# Patient Record
Sex: FEMALE | Race: WHITE | NOT HISPANIC OR LATINO | Employment: OTHER | URBAN - METROPOLITAN AREA
[De-identification: names, ages, dates, MRNs, and addresses within clinical notes are randomized per-mention and may not be internally consistent; named-entity substitution may affect disease eponyms.]

---

## 2019-02-11 ENCOUNTER — HOSPITAL ENCOUNTER (OUTPATIENT)
Dept: NON INVASIVE DIAGNOSTICS | Facility: HOSPITAL | Age: 74
Discharge: HOME | End: 2019-02-11
Attending: OTOLARYNGOLOGY
Payer: COMMERCIAL

## 2019-02-11 ENCOUNTER — TRANSCRIBE ORDERS (OUTPATIENT)
Dept: ADMINISTRATIVE | Facility: HOSPITAL | Age: 74
End: 2019-02-11

## 2019-02-11 ENCOUNTER — APPOINTMENT (OUTPATIENT)
Dept: LAB | Facility: HOSPITAL | Age: 74
End: 2019-02-11
Attending: OTOLARYNGOLOGY
Payer: COMMERCIAL

## 2019-02-11 DIAGNOSIS — R68.2 DRY MOUTH: ICD-10-CM

## 2019-02-11 DIAGNOSIS — J32.3 CHRONIC SPHENOIDAL SINUSITIS: ICD-10-CM

## 2019-02-11 DIAGNOSIS — R68.2 DRY MOUTH: Primary | ICD-10-CM

## 2019-02-11 DIAGNOSIS — J32.2 CHRONIC ETHMOIDAL SINUSITIS: ICD-10-CM

## 2019-02-11 DIAGNOSIS — J32.9 CHRONIC SINUSITIS, UNSPECIFIED LOCATION: ICD-10-CM

## 2019-02-11 LAB
ANION GAP SERPL CALCULATED.3IONS-SCNC: 10 MMOL/L (ref 4–13)
ATRIAL RATE: 83 BPM
BASOPHILS # BLD AUTO: 0.04 THOUSANDS/ΜL (ref 0–0.1)
BASOPHILS NFR BLD AUTO: 0 % (ref 0–1)
BUN SERPL-MCNC: 13 MG/DL (ref 5–25)
CALCIUM SERPL-MCNC: 9.3 MG/DL (ref 8.3–10.1)
CHLORIDE SERPL-SCNC: 109 MMOL/L (ref 100–108)
CO2 SERPL-SCNC: 25 MMOL/L (ref 21–32)
CREAT SERPL-MCNC: 0.99 MG/DL (ref 0.6–1.3)
EOSINOPHIL # BLD AUTO: 0.45 THOUSAND/ΜL (ref 0–0.61)
EOSINOPHIL NFR BLD AUTO: 5 % (ref 0–6)
ERYTHROCYTE [DISTWIDTH] IN BLOOD BY AUTOMATED COUNT: 14.2 % (ref 11.6–15.1)
GFR SERPL CREATININE-BSD FRML MDRD: 56 ML/MIN/1.73SQ M
GLUCOSE SERPL-MCNC: 96 MG/DL (ref 65–140)
HCT VFR BLD AUTO: 40.3 % (ref 34.8–46.1)
HGB BLD-MCNC: 12.2 G/DL (ref 11.5–15.4)
IMM GRANULOCYTES # BLD AUTO: 0.03 THOUSAND/UL (ref 0–0.2)
IMM GRANULOCYTES NFR BLD AUTO: 0 % (ref 0–2)
LYMPHOCYTES # BLD AUTO: 3.59 THOUSANDS/ΜL (ref 0.6–4.47)
LYMPHOCYTES NFR BLD AUTO: 38 % (ref 14–44)
MCH RBC QN AUTO: 27.9 PG (ref 26.8–34.3)
MCHC RBC AUTO-ENTMCNC: 30.3 G/DL (ref 31.4–37.4)
MCV RBC AUTO: 92 FL (ref 82–98)
MONOCYTES # BLD AUTO: 0.75 THOUSAND/ΜL (ref 0.17–1.22)
MONOCYTES NFR BLD AUTO: 8 % (ref 4–12)
NEUTROPHILS # BLD AUTO: 4.51 THOUSANDS/ΜL (ref 1.85–7.62)
NEUTS SEG NFR BLD AUTO: 49 % (ref 43–75)
NRBC BLD AUTO-RTO: 0 /100 WBCS
P AXIS: 74 DEGREES
PLATELET # BLD AUTO: 214 THOUSANDS/UL (ref 149–390)
PMV BLD AUTO: 11.8 FL (ref 8.9–12.7)
POTASSIUM SERPL-SCNC: 3.9 MMOL/L (ref 3.5–5.3)
PR INTERVAL: 156 MS
QRS AXIS: 40 DEGREES
QRSD INTERVAL: 86 MS
QT INTERVAL: 386 MS
QTC INTERVAL: 453 MS
RBC # BLD AUTO: 4.38 MILLION/UL (ref 3.81–5.12)
SODIUM SERPL-SCNC: 144 MMOL/L (ref 136–145)
T WAVE AXIS: 60 DEGREES
VENTRICULAR RATE: 83 BPM
WBC # BLD AUTO: 9.37 THOUSAND/UL (ref 4.31–10.16)

## 2019-02-11 PROCEDURE — 85025 COMPLETE CBC W/AUTO DIFF WBC: CPT

## 2019-02-11 PROCEDURE — 93005 ELECTROCARDIOGRAM TRACING: CPT

## 2019-02-11 PROCEDURE — 93010 ELECTROCARDIOGRAM REPORT: CPT | Performed by: INTERNAL MEDICINE

## 2019-02-11 PROCEDURE — 36415 COLL VENOUS BLD VENIPUNCTURE: CPT

## 2019-02-11 PROCEDURE — 80048 BASIC METABOLIC PNL TOTAL CA: CPT

## 2019-02-22 PROCEDURE — 88305 TISSUE EXAM BY PATHOLOGIST: CPT | Performed by: PATHOLOGY

## 2019-02-25 ENCOUNTER — LAB REQUISITION (OUTPATIENT)
Dept: LAB | Facility: HOSPITAL | Age: 74
End: 2019-02-25
Payer: COMMERCIAL

## 2019-02-25 DIAGNOSIS — J32.2 CHRONIC ETHMOIDAL SINUSITIS: ICD-10-CM

## 2019-02-25 DIAGNOSIS — J32.1 CHRONIC FRONTAL SINUSITIS: ICD-10-CM

## 2020-08-31 ENCOUNTER — HOSPITAL ENCOUNTER (OUTPATIENT)
Dept: RADIOLOGY | Facility: HOSPITAL | Age: 75
Discharge: HOME | End: 2020-08-31
Attending: OTOLARYNGOLOGY
Payer: COMMERCIAL

## 2020-08-31 DIAGNOSIS — R13.14 PHARYNGOESOPHAGEAL DYSPHAGIA: ICD-10-CM

## 2020-08-31 PROCEDURE — 74220 X-RAY XM ESOPHAGUS 1CNTRST: CPT

## 2020-09-03 ENCOUNTER — TELEPHONE (OUTPATIENT)
Dept: GASTROENTEROLOGY | Facility: CLINIC | Age: 75
End: 2020-09-03

## 2020-09-03 ENCOUNTER — HOSPITAL ENCOUNTER (OUTPATIENT)
Dept: RADIOLOGY | Facility: HOSPITAL | Age: 75
Discharge: HOME | End: 2020-09-03
Attending: OTOLARYNGOLOGY
Payer: COMMERCIAL

## 2020-09-03 DIAGNOSIS — T17.908A ASPIRATION INTO AIRWAY, INITIAL ENCOUNTER: ICD-10-CM

## 2020-09-03 PROCEDURE — 92611 MOTION FLUOROSCOPY/SWALLOW: CPT

## 2020-09-03 PROCEDURE — 74230 X-RAY XM SWLNG FUNCJ C+: CPT

## 2020-09-03 NOTE — TELEPHONE ENCOUNTER
----- Message from Herb Luciano MD sent at 9/3/2020  3:42 PM EDT -----  Regarding: FW: referral  Can be make sure we get this patient are schedule sooner rather than later  Thanks Mercy Pratt!  ----- Message -----  From: Salma Dela Cruz MD  Sent: 9/1/2020  10:25 AM EDT  To: Herb Luciano MD, Aponte Never  Subject: referral                                         Ximena Valdez is sending us this patient for dysphagia/aspiration and esophageal dysmotility on barium swallow - like a "flaccid esophagus"  A lot of issues to sort out together but wanted to give you heads up  I am going to try to get her on my schedule ASAP to see what I can do to help with aspiration from laryngopharyngeal end  I think a VBS is in the works as well        Valdo Hunter

## 2020-09-03 NOTE — PROCEDURES
Video Swallow Study      Patient Name: Thong GANDHIY Date: 9/3/2020        Past Medical History  Past Medical History:   Diagnosis Date    Allergic     Anxiety     Brain aneurysm     Cataract     Cough     Depression     Emphysema of lung ([facility] [address] )     Hearing loss     Hypertension     Hypothyroid     Leukocytoclastic vasculitis (HCC)     BRENDEN (mycobacterium avium-intracellulare) ([facility] [address] )     MGUS (monoclonal gammopathy of unknown significance)     Migraine     Nasal congestion     Neuropathy     Peripheral neuralgia     Pneumonia     Prediabetes     Renal insufficiency     Shortness of breath     Sinusitis     TIA (transient ischemic attack)     Type 2 diabetes mellitus (HCC)     UTI (urinary tract infection)     Vasculitis (HCC)    Reflux, on PPI     Past Surgical History  Past Surgical History:   Procedure Laterality Date    BACK SURGERY      CATARACT EXTRACTION, BILATERAL      GALLBLADDER SURGERY      HYSTERECTOMY      LUNG SURGERY      NECK SURGERY      SINUS SURGERY         Video Barium Swallow Study    Summary:  Pt presented w/ severe dry mouth (reports it is due to medications, ? Other)  Today she had difficulty manipulating,  breaking down, and transferring solids  Purees and liquids traversed most easily and there was no oropharyngeal retention of purees or liquids (min w/ solids)  Epiglottic inversion, hyoid excursion, and pharyngeal constriction were WNL  Transient penetration was observed w/ thin liquis  No aspiration  Questionable small esophageal web in the area of C6-7 though there was no retention of material or delay in transit  No retention in the CP/UES area  Per gross esophageal screen: Slow motility and stasis  A barium pill was given x 2  Additional PO was required to clear it from the DISTAL esophagus  Retropulsion noted  Other than slow motility, can not r/o distal stricture   The pt pointed to her suprasternal notch and stated she felt the pill there  She was shown on the monitor that the pill was more distal      ? Risk for bottom-up aspiration  Images are on PACS for review  Recommendations:  Diet: Avoid dry foods  Otherwise, regular  She has never eaten meat, and she no longer eats bread  Add moisture to foods  Smaller, more frequent meals  Liquids: thin  Meds: may need to break or crush if large (and allowable)  Follow pills w/ additional PO  Strategies: remain upright after meals  Eat slowly  Small sips in between bites  Upright position  F/u ST tx: No  Aspiration Precautions  Reflux Precautions  Elevate HOB at night  Consider consult with: GI, ? EGD  Pt has upcoming apptmnt w/ Dr Kimberli Valdez  Results reviewed with: pt,     Pt is a 73yof referred by ENT for vbs  Pt reported recurrent respiratory issues and having "had a cough for months"  Reports she coughs more at night and can not lie flat  Also noted dry mouth which she reports is due to medications  Avoids bread because it "sticks in a ball"  Reports early satiety  She also indicated a h/o diarrhea/constipation  Previous VBS:  No VBS  Per esophagram 8/31/20:  FINDINGS:  The esophagus is normal in caliber  There is abnormal esophageal motility with retention of barium throughout the esophagus which does not clear with repeated swallowing  No mucosal lesion, ulceration or evidence of fold thickening is seen  A small   amount of aspiration was noted  There is limited evaluation of the cardia as the patient did not retain air  Gastroesophageal reflux was not observed  There is no hiatal hernia  IMPRESSION:  Esophageal dysmotility with retention of barium  Current Diet:  No bread or meat  Dentition:  Natural  Has most   O2 requirement:  none  Oral mech:  Strength and ROM:  Wnl  Mouth dry     Vocal Quality/Speech:  Wnl, reduced breath support for speech  Cognitive status:  Alert and oriented    Consistencies administered: Barium laden applesauce, granola bar, hard cookie, nectar thick, thin liquids, 13mm barium pill x 1 w/ thin and x 1 in puree  Liquids were administered by cup and straw  Pt was seated laterally at 90 degrees  Pt viewed standing in AP position    -----------------------    ENT consult Dr Bee Hitchcock 8/24/20:  Otolaryngology-- Head and Neck Surgery Follow up visit  CC: cough/reflux  Time interval of problem since last visit:  2 months  Pertinent interval elements of the history:  Jose Juan Almonte presents with shortness of breath today  She has a history of severe emphysema/COPD and a 40 year smoking history, though she quit 9 years ago  Shortness of breath has been worse over the past 3 days  She had a lung CA surveillance CT (by coincidence) earlier today that showed a lung consolidation consistent with pneumonia  Her cough is unchanged from her last visit, whilst on Pepcid/Protonix  Interval Review of systems:  General: no weight change, normal energy  CV: no palpitations or chest pain  Pulm: no shortness of breath  Assessment:  1  Pharyngoesophageal dysphagia  FL barium swallow   2  Pneumonia due to infectious organism, unspecified laterality, unspecified part of lung        Plan: 1  She is short of breath and mildly hypoxic with CT proven pneumonia  She has risk factors for this (COPD/emphysema), but out of an abundance of caution I performed a COVID swab today  She will follow up with pulmonary to have the pneumonia treated  I will call her with the PCR results  2  She also complains of dysphagia to solids, not liquids  This has been recurrent and progressive and she feels that food gets stuck at the level of the cricopharyngeus  I ordered a barium esophagram and she will follow up after this study  8/31 per Dr Bee Hitchcock:  Progress Notes:    Her barium esophagram showed esophageal dysmotility and aspiration  She has had a recent pneumonia and I am concerned that this was triggered by aspiration    I have referred her for a modified barium swallow and to GI to have these two issues evaluated

## 2020-09-17 NOTE — TELEPHONE ENCOUNTER
I spoke to the patient to get her in for tomorrow, but she stated she spoke to her ENT Doctor because she is in the [place] of [place] and will continue a treatment plan there

## 2021-02-12 DIAGNOSIS — Z23 ENCOUNTER FOR IMMUNIZATION: ICD-10-CM

## 2021-03-04 ENCOUNTER — OFFICE VISIT (OUTPATIENT)
Dept: URBAN - METROPOLITAN AREA CLINIC 81 | Facility: CLINIC | Age: 76
End: 2021-03-04
Payer: MEDICARE

## 2021-03-04 DIAGNOSIS — H01.8 OTHER SPECIFIED INFLAMMATIONS OF EYELID: Chronic | ICD-10-CM

## 2021-03-04 DIAGNOSIS — H18.832 RECURRENT EROSION OF CORNEA, LEFT EYE: Primary | Chronic | ICD-10-CM

## 2021-03-04 DIAGNOSIS — H04.123 DRY EYE SYNDROME OF BILATERAL LACRIMAL GLANDS: Chronic | ICD-10-CM

## 2021-03-04 PROCEDURE — 99203 OFFICE O/P NEW LOW 30 MIN: CPT | Performed by: OPTOMETRIST

## 2021-03-04 RX ORDER — NEOMYCIN SULFATE, POLYMYXIN B SULFATE AND DEXAMETHASONE 3.5; 10000; 1 MG/ML; [USP'U]/ML; MG/ML
SUSPENSION OPHTHALMIC
Qty: 5 | Refills: 1 | Status: ACTIVE
Start: 2021-03-04

## 2021-03-04 ASSESSMENT — INTRAOCULAR PRESSURE
OD: 12
OS: 12

## 2021-03-04 NOTE — IMPRESSION/PLAN
Impression: Other specified inflammations of eyelid: H01.8. Plan: Blepharitis accounts for the patient's symptoms. Lid scrubs with diluted Annamarie Course and Guy tear free baby shampoo as directed and monitor PRN. Start Maxitrol gtt OU QID as directed.

## 2021-03-04 NOTE — IMPRESSION/PLAN
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 Plan: Resolving. Discussed diagnosis with patient in detail. Start Systane Complete or Refresh Relieva OU QID, longterm. Will continue to observe condition and/or symptoms. Patient instructed to call if condition gets worse. D/C Brant/Poly/gramcidin gtt.